# Patient Record
Sex: FEMALE | Race: WHITE
[De-identification: names, ages, dates, MRNs, and addresses within clinical notes are randomized per-mention and may not be internally consistent; named-entity substitution may affect disease eponyms.]

---

## 2017-09-16 NOTE — RADIOLOGY REPORT (SQ)
EXAM DESCRIPTION:  CERV SP 4 OR 5 VIEWS



COMPLETED DATE/TIME:  9/16/2017 11:05 am



REASON FOR STUDY:  NECK PAIN M54.2  CERVICALGIA



COMPARISON:  None.



NUMBER OF VIEWS:  Five views.



TECHNIQUE:  AP, lateral, obliques and odontoid radiographic images acquired of the cervical spine.



LIMITATIONS:  None.



FINDINGS:  MINERALIZATION: Normal.

ALIGNMENT: Anatomic.

VERTEBRAE: Vertebral bodies of normal height.

DISCS: No significant osteophytes or sclerosis. Disc height maintained.

FORAMINA: No osteophytes or foraminal narrowing.

LATERAL AND POSTERIOR ELEMENTS: Facets, lateral masses and spinous processes without significant find
ings.

HARDWARE: None in the spine.

SOFT TISSUES: No masses or calcifications. Lung apices clear.

OTHER: No other significant finding.



IMPRESSION:  NO SIGNIFICANT RADIOGRAPHIC FINDING IN THE CERVICAL SPINE.



TECHNICAL DOCUMENTATION:  JOB ID:  [id number]

 2011 [employer]- All Rights Reserved

## 2017-09-18 NOTE — RADIOLOGY REPORT (SQ)
EXAM DESCRIPTION:  LUMBAR SPINE COMPLETE



COMPLETED DATE/TIME:  9/18/2017 4:20 pm



REASON FOR STUDY:  LUMBAGO WITH SCIATICA, LEFT SIDE M54.42  LUMBAGO WITH SCIATICA, LEFT SIDE



COMPARISON:  None.



NUMBER OF VIEWS:  Five views including obliques.



TECHNIQUE:  AP, lateral, oblique, and sacral radiographic images acquired of the lumbar spine.



LIMITATIONS:  None.



FINDINGS:  MINERALIZATION: Normal.

SEGMENTATION: Normal.  No transitional anatomy.

ALIGNMENT: Normal.

VERTEBRAE: Maintained height.  No fracture or worrisome bone lesion.

DISCS: Preserved height.  No significant osteophytes or end plate irregularity.

POSTERIOR ELEMENTS: Pedicles and facets are intact.  No pars defect or posterior arch defects.

HARDWARE: None in the spine.

PARASPINAL SOFT TISSUES: Normal.

PELVIS: Intact as visualized. No fractures or worrisome bone lesions. SI joints intact.

OTHER: No other significant finding.



IMPRESSION:  NORMAL 5 VIEW LUMBAR SPINE.



TECHNICAL DOCUMENTATION:  JOB ID:  [id number]

 2011 [employer]- All Rights Reserved

## 2017-10-09 NOTE — WOMENS IMAGING REPORT
EXAM DESCRIPTION:  BILAT SCREENING MAMMO W/CAD



COMPLETED DATE/TIME:  10/9/2017 7:22 am



REASON FOR STUDY:  SCREENING MAMMO Z12.31  ENCNTR SCREEN MAMMOGRAM FOR MALIGNANT NEOPLASM OF ROSANA



COMPARISON:  2013, 2016



TECHNIQUE:  Standard craniocaudal and mediolateral oblique views of each breast recorded using digita
l acquisition.



LIMITATIONS:  None.



FINDINGS:  No masses, calcifications or architectural distortion. No areas of suspicion.

Read with the assistance of CAD.

.[facility] - R2 Cenova Version 1.3

.[facility] - R2 Cenova Version 1.3

.[facility] - R2 Cenova Version 2.4

[place] Cenova [place] 2.[place]

.[facility] - R2  Version 9.2



IMPRESSION:  NORMAL MAMMOGRAM.  BIRADS 1.



BREAST DENSITY:  a. The breasts are almost entirely fatty.



BIRAD:  1 NEGATIVE



RECOMMENDATION:  ROUTINE SCREENING

Please consider bilateral screening tomosynthesis in October 2018



COMMENT:  The patient has been notified of the results by letter per SA requirements. Additional no
tification policies are in place for contacting patient with suspicious or incomplete findings.

Quality ID #225: The American College of Radiology recommends an annual screening mammogram for women
 aged 40 years or over. This facility utilizes a reminder system to ensure that all patients receive 
reminder letters, and/or direct phone calls for appointments. This includes reminders for routine scr
eening mammograms, diagnostic mammograms, or other Breast Imaging Interventions when appropriate.  Th
is patient will be placed in the appropriate reminder system.

The American College of Radiology (ACR) has developed recommendations for screening MRI of the breast
s in certain patient populations, to be used in conjunction with mammography.  Breast MRI surveillanc
e may be appropriate for women with more than 20% lifetime risk of developing breast cancer  as deter
mined by genetic testing, significant family history of the disease, or history of mantle radiation f
or Hodgkins Disease.  ACR Practice Guidelines 2008.



TECHNICAL DOCUMENTATION:  FINDING NUMBER: (1)

ASSESSMENT: (1)

JOB ID:  [id number]

 2011 [employer]- All Rights Reserved

## 2018-08-17 NOTE — WOMENS IMAGING REPORT
EXAM DESCRIPTION:  TRANSVAGINAL ULTRASOUND



COMPLETED DATE/TIME:  8/17/2018 1:24 pm



REASON FOR STUDY:  PELVIC AND PERINEAL PAIN R10.2  PELVIC AND PERINEAL PAIN



COMPARISON:  9/2/2011 pelvic ultrasound



TECHNIQUE:  Dynamic and static grayscale images acquired of the pelvis via transvaginal approach and 
recorded on PACS. Additional selected color Doppler and spectral images recorded.



LIMITATIONS:  Left ovary not visualized due to adnexal bowel gas



FINDINGS:  UTERUS: Contour normal.  No mass.  Uterus is 8.5 x 4.9 x 4.2 cm in size.

ENDOMETRIAL STRIPE: Endometrial stripe 8 mm in thickness.  Trace amount of fluid in the right and lef
t cornu of the uterus in the endometrial canal.

CERVIX: No nabothian cysts.  Closed

RIGHT OVARY AND DOPPLER: Patient states she is post right oophorectomy in 1996

LEFT OVARY AND DOPPLER: Not visualized due to left adnexal bowel gas.

FREE FLUID: None noted.

OTHER: No other significant finding.



IMPRESSION:  Post right oophorectomy.  Left ovary not visualized.

Normal size uterus without fibroids.



TECHNICAL DOCUMENTATION:  JOB ID:  [id number]

 2011 [employer]- All Rights Reserved                          Rev-5/18



Reading location - IP/workstation name: [email or web address]

## 2018-08-24 NOTE — OPERATIVE REPORT
Operative Report


DATE OF SURGERY: 08/24/18


Operative Report: 





The risks, benefits and alternatives of the procedure including the risks of 

bleeding, perforation requiring surgery are explained to the patient in detail 

and informed consent is obtained.  Patient is brought back to the operating 

room and placed in the left, lateral decubital position.  Timeout was called.  

Propofol medication is administered.  Rectal examination is done which did not 

reveal any masses, tears or fissures.  An Olympus videoscope was inserted into 

the patient's rectum.  The scope was then carefully advanced all the way to the 

cecum.  The cecum was identified by the usual anatomical landmarks including 

the ileocecal valve as well as the appendiceal office.  Photodocumentation is 

obtained.  The scope was then sequentially pulled back via the rest segments of 

the colon including the ascending colon, hepatic flexure, transverse colon, 

splenic flexure, descending colon and finally into the rectosigmoid portions of 

the colon.  Retroflexion maneuvers performed.


The risks benefits and alternatives of the procedure explained to the patient 

in detail and informed consent is obtained.A GIF Olympus video scope was 

inserted into the patient's mouth and hypopharynx, the esophagus is identified 

intubated and insufflated, the scope was then advanced through the esophagus 

stomach and duodenum ,retroflexion maneuver is done, the esophagus stomach and 

first and second portions of the duodenum examined


PREOPERATIVE DIAGNOSIS: Change in bowel habits.  GERD, dysphagia


POSTOPERATIVE DIAGNOSIS: Right sidecolon Information status post biopsy.  Colon 

polyp was removed via snare polypectomy.  Internal hemorrhoids.  Esophagitis 

status post biopsy rule out eosinophilic esophagitis.  Gastritis status post 

biopsy rule out Helicobacter pylori


OPERATION: Colonoscopy with snare polypectomy.  Colonoscopy with biopsy.  EGD 

with biopsy


SURGEON: SALVADOR COLÓN


ANESTHESIA: LMAC


TISSUE REMOVED OR ALTERED: As noted above.


COMPLICATIONS: 





None.


ESTIMATED BLOOD LOSS: None.


INTRAOPERATIVE FINDINGS: As noted above.


PROCEDURE: 





Patient tolerated the procedure well.


No immediate postprocedure complications are noted.


Patient discharged in good condition.


Discharge date 8/24/2018.


Discharge diet: Regular.


Discharge activity: Regular.


2-3 week follow-up to discuss findings.


Patient is instructed call the office or proceed to the emergency room should 

there be any further problems or questions.


3-5 year surveillance colonoscopy.

## 2018-11-03 NOTE — WOMENS IMAGING REPORT
EXAM DESCRIPTION:  3D SCREENING MAMMO BILAT



COMPLETED DATE/TIME:  11/2/2018 4:07 pm



REASON FOR STUDY:  BILATERAL SCREENING MAMMO/Z12.31 Z12.31  ENCNTR SCREEN MAMMOGRAM FOR MALIGNANT MIRELLA
PLASM OF ROSANA



COMPARISON:  10/9/2017 and 10/7/2016.



TECHNIQUE:  Standard craniocaudal and mediolateral oblique views of each breast recorded using digita
l acquisition and breast tomosynthesis.



LIMITATIONS:  None.



FINDINGS:  No masses, calcifications or architectural distortion. No areas of suspicion.

Read with the assistance of CAD.

.[facility] - R2 Cenova Version 1.3

.[facility] - R2 Cenova Version 1.3

.[facility] - R2 Cenova Version 2.4

[place]2 Cenova Version 2.4

.[facility] - R2  Version 9.2



IMPRESSION:  NORMAL MAMMOGRAM.  BIRADS 1.



BREAST DENSITY:  a. The breasts are almost entirely fatty.



BIRAD:  1 NEGATIVE



RECOMMENDATION:  ROUTINE SCREENING



COMMENT:  The patient has been notified of the results by letter per MQSA requirements. Additional no
tification policies are in place for contacting patient with suspicious or incomplete findings.

Quality ID #225: The American College of Radiology recommends an annual screening mammogram for women
 aged 40 years or over. This facility utilizes a reminder system to ensure that all patients receive 
reminder letters, and/or direct phone calls for appointments. This includes reminders for routine scr
eening mammograms, diagnostic mammograms, or other Breast Imaging Interventions when appropriate.  Th
is patient will be placed in the appropriate reminder system.

The American College of Radiology (ACR) has developed recommendations for screening MRI of the breast
s in certain patient populations, to be used in conjunction with mammography.  Breast MRI surveillanc
e may be appropriate for women with more than 20% lifetime risk of developing breast cancer  as deter
mined by genetic testing, significant family history of the disease, or history of mantle radiation f
or Hodgkins Disease.  ACR Practice Guidelines 2008.

DBT Technology

DBT is a type of tomographic mammography. With conventional mammography, overlapping breast tissue ma
y make lesions difficult to detect, even with good compression. DBT uses an x-ray tube that rotates a
round the breast, taking images at different angles. These images are then combined to create thin sl
ices of the breast that the radiologist can view as a 3D reconstruction. The [facility] can perform
 full-field digital mammograms (2D imaging); or DBT (3D imaging); or both, in a combination mode that
 quickly performs both the mammogram and the tomosynthesis scan while the breast is still compressed.


PQRS 6045F: Fluoroscopic imaging is not utilized for breast tomosynthesis.



TECHNICAL DOCUMENTATION:  FINDING NUMBER: (1)

ASSESSMENT:  (1)

JOB ID:  [id number]

 2011 [employer]- All Rights Reserved



Reading location - IP/workstation name: [email or web address]

## 2019-08-18 ENCOUNTER — HOSPITAL ENCOUNTER (EMERGENCY)
Dept: HOSPITAL 62 - ER | Age: 39
Discharge: HOME | End: 2019-08-18
Payer: MEDICAID

## 2019-08-18 VITALS — DIASTOLIC BLOOD PRESSURE: 82 MMHG | SYSTOLIC BLOOD PRESSURE: 143 MMHG

## 2019-08-18 DIAGNOSIS — R10.13: Primary | ICD-10-CM

## 2019-08-18 DIAGNOSIS — R19.7: ICD-10-CM

## 2019-08-18 DIAGNOSIS — Z87.891: ICD-10-CM

## 2019-08-18 DIAGNOSIS — R11.2: ICD-10-CM

## 2019-08-18 DIAGNOSIS — I10: ICD-10-CM

## 2019-08-18 LAB
ADD MANUAL DIFF: NO
ALBUMIN SERPL-MCNC: 5.1 G/DL (ref 3.5–5)
ALP SERPL-CCNC: 63 U/L (ref 38–126)
ANION GAP SERPL CALC-SCNC: 12 MMOL/L (ref 5–19)
APPEARANCE UR: (no result)
APTT PPP: YELLOW S
AST SERPL-CCNC: 31 U/L (ref 14–36)
BASOPHILS # BLD AUTO: 0 10^3/UL (ref 0–0.2)
BASOPHILS NFR BLD AUTO: 0.2 % (ref 0–2)
BILIRUB DIRECT SERPL-MCNC: 0.3 MG/DL (ref 0–0.4)
BILIRUB SERPL-MCNC: 1.3 MG/DL (ref 0.2–1.3)
BILIRUB UR QL STRIP: NEGATIVE
BUN SERPL-MCNC: 13 MG/DL (ref 7–20)
CALCIUM: 10.3 MG/DL (ref 8.4–10.2)
CHLORIDE SERPL-SCNC: 100 MMOL/L (ref 98–107)
CO2 SERPL-SCNC: 29 MMOL/L (ref 22–30)
EOSINOPHIL # BLD AUTO: 0.1 10^3/UL (ref 0–0.6)
EOSINOPHIL NFR BLD AUTO: 0.7 % (ref 0–6)
ERYTHROCYTE [DISTWIDTH] IN BLOOD BY AUTOMATED COUNT: 13.8 % (ref 11.5–14)
GLUCOSE SERPL-MCNC: 99 MG/DL (ref 75–110)
GLUCOSE UR STRIP-MCNC: NEGATIVE MG/DL
HCT VFR BLD CALC: 48.9 % (ref 36–47)
HGB BLD-MCNC: 16 G/DL (ref 12–15.5)
KETONES UR STRIP-MCNC: 20 MG/DL
LYMPHOCYTES # BLD AUTO: 3.3 10^3/UL (ref 0.5–4.7)
LYMPHOCYTES NFR BLD AUTO: 23.9 % (ref 13–45)
MCH RBC QN AUTO: 27.2 PG (ref 27–33.4)
MCHC RBC AUTO-ENTMCNC: 32.7 G/DL (ref 32–36)
MCV RBC AUTO: 83 FL (ref 80–97)
MONOCYTES # BLD AUTO: 0.8 10^3/UL (ref 0.1–1.4)
MONOCYTES NFR BLD AUTO: 6.1 % (ref 3–13)
NEUTROPHILS # BLD AUTO: 9.7 10^3/UL (ref 1.7–8.2)
NEUTS SEG NFR BLD AUTO: 69.1 % (ref 42–78)
NITRITE UR QL STRIP: NEGATIVE
PH UR STRIP: 6 [PH] (ref 5–9)
PLATELET # BLD: 339 10^3/UL (ref 150–450)
POTASSIUM SERPL-SCNC: 4.4 MMOL/L (ref 3.6–5)
PROT SERPL-MCNC: 8.7 G/DL (ref 6.3–8.2)
PROT UR STRIP-MCNC: NEGATIVE MG/DL
RBC # BLD AUTO: 5.89 10^6/UL (ref 3.72–5.28)
SP GR UR STRIP: 1.02
TOTAL CELLS COUNTED % (AUTO): 100 %
UROBILINOGEN UR-MCNC: NEGATIVE MG/DL (ref ?–2)
WBC # BLD AUTO: 13.9 10^3/UL (ref 4–10.5)

## 2019-08-18 PROCEDURE — 96375 TX/PRO/DX INJ NEW DRUG ADDON: CPT

## 2019-08-18 PROCEDURE — 96374 THER/PROPH/DIAG INJ IV PUSH: CPT

## 2019-08-18 PROCEDURE — 93010 ELECTROCARDIOGRAM REPORT: CPT

## 2019-08-18 PROCEDURE — 76705 ECHO EXAM OF ABDOMEN: CPT

## 2019-08-18 PROCEDURE — 99284 EMERGENCY DEPT VISIT MOD MDM: CPT

## 2019-08-18 PROCEDURE — 84484 ASSAY OF TROPONIN QUANT: CPT

## 2019-08-18 PROCEDURE — 36415 COLL VENOUS BLD VENIPUNCTURE: CPT

## 2019-08-18 PROCEDURE — 93005 ELECTROCARDIOGRAM TRACING: CPT

## 2019-08-18 PROCEDURE — 85025 COMPLETE CBC W/AUTO DIFF WBC: CPT

## 2019-08-18 PROCEDURE — 80053 COMPREHEN METABOLIC PANEL: CPT

## 2019-08-18 PROCEDURE — 81001 URINALYSIS AUTO W/SCOPE: CPT

## 2019-08-18 PROCEDURE — 74022 RADEX COMPL AQT ABD SERIES: CPT

## 2019-08-18 PROCEDURE — 83690 ASSAY OF LIPASE: CPT

## 2019-08-18 NOTE — EKG REPORT
SEVERITY:- OTHERWISE NORMAL ECG -

SINUS RHYTHM

BORDERLINE LEFT AXIS DEVIATION

:

Confirmed by: Fabio Larry 18-Aug-2019 11:42:53

## 2019-08-18 NOTE — RADIOLOGY REPORT (SQ)
EXAM DESCRIPTION:  ACUTE ABDOMEN SERIES



COMPLETED DATE/TIME:  8/18/2019 11:02 am



REASON FOR STUDY:  epigastric pain



COMPARISON:  None.



NUMBER OF VIEWS:  Three views.



TECHNIQUE:  Frontal chest, supine abdomen and upright/decubitus abdomen radiographic images acquired.




LIMITATIONS:  None.



FINDINGS:  CHEST: Lungs clear of infiltrates.

FREE AIR: None. No abnormal gas collections.

BOWEL GAS PATTERN: Nonobstructive pattern. No dilated loops or air fluid levels.

CALCIFICATIONS: No suspicious calcifications.

HARDWARE: None in the abdomen.

SOFT TISSUES: No gross mass or suggestion of organomegaly.

BONES: No acute fracture.  No worrisome bone lesions.

OTHER: No other significant finding.



IMPRESSION:  NO RADIOGRAPHIC EVIDENCE FOR ACUTE ABDOMINAL DISEASE.



TECHNICAL DOCUMENTATION:  JOB ID:  [id number]

 2011 [employer]- All Rights Reserved



Reading location - IP/workstation name: [email or web address]

## 2019-08-18 NOTE — ER DOCUMENT REPORT
Entered by ADRIENNE MELVIN SCRIBE  08/18/19 0933 





Acting as scribe for:LINDSEY RIGGS MD





ED General





- General


Chief Complaint: Abdominal Pain


Stated Complaint: ABDOMINAL PAIN


Time Seen by Provider: 08/18/19 09:27


Primary Care Provider: 


LYNETTE VALENCIA PA-C [Primary Care Provider] - Follow up in 3-5 days


Notes: 





38-year-old female presenting to the emergency department complaining of 

abdominal pain.  Patient states that.  She has had this pain for 3days 

intermittently, it became constant when she woke up this morning.  Patient 

states that she is also experiencing nausea, vomiting, diarrhea, has had trouble

sleeping, and trouble eating food.  Patient denies experiencing any sour taste 

in her mouth, or the pain radiating anywhere.


TRAVEL OUTSIDE OF THE [place] IN LAST 30 DAYS: No





- Related Data


Allergies/Adverse Reactions: 


                                        





No Known Allergies Allergy (Verified 08/18/19 09:08)


   











Past Medical History





- General


Information source: Patient





- Social History


Smoking Status: Former Smoker


Cigarette use (# per day): No


Chew tobacco use (# tins/day): No


Patient has suicidal ideation: No


Patient has homicidal ideation: No





- Past Medical History


Cardiac Medical History: Reports: Hx Hypertension


Past Surgical History: Reports: Hx Gynecologic Surgery - OOPHRECTOMY, Hx Tubal 

Ligation





- Immunizations


Immunizations up to date: Yes


Hx Diphtheria, Pertussis, Tetanus Vaccination: Yes





Review of Systems





- Review of Systems


Constitutional: No symptoms reported


EENT: No symptoms reported


Cardiovascular: No symptoms reported


Respiratory: No symptoms reported


Gastrointestinal: No symptoms reported


Genitourinary: No symptoms reported


Female Genitourinary: No symptoms reported


Musculoskeletal: No symptoms reported


Skin: No symptoms reported


Hematologic/Lymphatic: No symptoms reported


Neurological/Psychological: No symptoms reported


-: Yes All other systems reviewed and negative





Physical Exam





- Vital signs


Vitals: 


                                        











Temp Pulse Resp BP Pulse Ox


 


 97.6 F   78   18   163/118 H  99 


 


 08/18/19 09:13  08/18/19 09:13  08/18/19 09:13  08/18/19 09:13  08/18/19 09:13














Course





- Re-evaluation


Re-evalutation: 





08/18/19 10:47


Pain is reproducible with right upper quadrant palpation to epigastric palpation

not consistent with ACS.  Gallbladder ultrasound shows no acute findings other 

than fatty infiltration of liver.  Her labs are within normal limits are 

nonsignificant.  Will provide GI cocktail and reassess at this time.  She states

that she feels like something stabbing her in the epigastric area.  Also perform

abdominal series x-ray at this time.





- Vital Signs


Vital signs: 


                                        











Temp Pulse Resp BP Pulse Ox


 


 98.2 F   60   16   143/82 H  95 


 


 08/18/19 12:33  08/18/19 12:33  08/18/19 12:33  08/18/19 12:33  08/18/19 12:33














- Laboratory


Result Diagrams: 


                                 08/18/19 09:20





                                 08/18/19 09:20


Laboratory results interpreted by me: 


                                        











  08/18/19 08/18/19 08/18/19





  09:20 09:20 10:39


 


WBC  13.9 H  


 


RBC  5.89 H  


 


Hgb  16.0 H  


 


Hct  48.9 H  


 


Absolute Neutrophils  9.7 H  


 


Calcium   10.3 H 


 


Total Protein   8.7 H 


 


Albumin   5.1 H 


 


Urine Ketones    20 H


 


Urine Blood    MODERATE H














- EKG Interpretation by Me


EKG shows normal: Sinus rhythm


Rate: Normal


Rhythm: NSR - Concerning ST depressions or elevations with normal intervals





Discharge





- Discharge


Clinical Impression: 


 Epigastric pain





Condition: Good


Disposition: HOME, SELF-CARE


Instructions:  Abdominal Pain ([facility]), Antinausea Medication ([facility]), Antispasmodics

([facility]), Low-Fat Diet ([facility])


Prescriptions: 


Bismuth Subsalicylate [Maalox] 1 dose PO DAILY PRN #1 bottle


 PRN Reason: 


Ondansetron [Zofran Odt 4 mg Tablet] 1 tab PO Q4H PRN #15 tab.rapdis


 PRN Reason: For Nausea/Vomiting


Oxycodone HCl/Acetaminophen [Percocet 5-325 mg Tablet] 1 tab PO ASDIR PRN #15 

tablet


 PRN Reason: 


Ranitidine HCl [Zantac] 150 mg PO BID #30 tablet


Forms:  Return to Work


Referrals: 


LYNETTE VALENCIA PA-C [Primary Care Provider] - Follow up in 3-5 days





I personally performed the services described in the documentation, reviewed and

edited the documentation which was dictated to the scribe in my presence, and it

accurately records my words and actions.

## 2019-08-18 NOTE — RADIOLOGY REPORT (SQ)
EXAM DESCRIPTION:  U/S ABDOMEN LIMITED W/O DOP



COMPLETED DATE/TIME:  8/18/2019 10:26 am



REASON FOR STUDY:  RUQ pain



COMPARISON:  None.



TECHNIQUE:  Dynamic and static grayscale images acquired of the abdomen and recorded on PACS. Additio
nal selected color Doppler and spectral images recorded.



LIMITATIONS:  None.



FINDINGS:  PANCREAS: Limited visualization. no masses seen

LIVER: Normal size Moderate to marked fatty infiltration. No focal masses.

LIVER VASCULATURE: Normal directional flow of the main portal vein and hepatic veins.

GALLBLADDER: No stones. Normal wall thickness. No pericholecystic fluid.

ULTRASOUND-DETECTED ODELL'S SIGN: Negative.

INTRAHEPATIC DUCTS AND COMMON DUCT: CBD and intrahepatic ducts normal caliber. No filling defects.

INFERIOR VENA CAVA: Normal flow.

AORTA: No aneurysm.

RIGHT KIDNEY:  Normal size.   Normal echogenicity.   No solid or suspicious masses.   No hydronephros
is.   No calcifications.

PERITONEAL AND RIGHT PLEURAL SPACE: No ascites or effusions.

OTHER: No other significant findings.



IMPRESSION:  FATTY LIVER. OTHERWISE NORMAL RUQ US AS VISUALIZED



TECHNICAL DOCUMENTATION:  JOB ID:  [id number]

 [employer]- All Rights Reserved



Reading location - IP/workstation name: [email or web address]

## 2019-08-26 ENCOUNTER — HOSPITAL ENCOUNTER (OUTPATIENT)
Dept: HOSPITAL 62 - RAD | Age: 39
End: 2019-08-26
Attending: PHYSICIAN ASSISTANT
Payer: MEDICAID

## 2019-08-26 DIAGNOSIS — R10.84: Primary | ICD-10-CM

## 2019-08-26 PROCEDURE — 74177 CT ABD & PELVIS W/CONTRAST: CPT

## 2019-08-26 NOTE — RADIOLOGY REPORT (SQ)
EXAM DESCRIPTION:  CT ABD/PELVIS WITH IV   ORAL



COMPLETED DATE/TIME:  8/26/2019 4:10 pm



REASON FOR STUDY:  R10.84 GENERALIZED ABDOMINAL PAIN R10.84  GENERALIZED ABDOMINAL PAIN



COMPARISON:  CT abdomen pelvis 9/28/2011

Pelvic ultrasound 8/17/2018

Abdominal ultrasound 8/18/2019



TECHNIQUE:  CT scan of the abdomen and pelvis performed using helical scanning technique with dynamic
 intravenous contrast injection.  Patient drank oral contrast. Images reviewed with lung, soft tissue
, and bone windows. Reconstructed coronal and sagittal MPR images reviewed. Delayed images for evalua
tion of the urinary system also acquired. All images stored on PACS.

All CT scanners at this facility use dose modulation, iterative reconstruction, and/or weight based d
osing when appropriate to reduce radiation dose to as low as reasonably achievable (ALARA).

[facility]: Dose Right  [facility]: CareDose    [facility]: Dose Right    [facility]: Teradose 4D    [facility]: Smart Technologies



CONTRAST TYPE AND DOSE:  contrast/concentration: Isovue 350.00 mg/ml; Total Contrast Delivered: 100.0
 ml; Total Saline Delivered: 72.0 ml



RENAL FUNCTION:  Creatinine 0.65



RADIATION DOSE:  CT Rad equipment meets quality standard of care and radiation dose reduction techniq
ues were employed. CTDIvol: 19.0 - 20.3 mGy. DLP: 2083 mGy-cm..



LIMITATIONS:  None.



FINDINGS:  In the left adnexa, a complex cystic mass is present 6 x 5 cm in size with a mural nodule 
and septations.  This is worrisome for a primary ovarian mass, malignancy could not be excluded.  Rep
ort discussed with Radha Moreno.

Uterus is 9 x 4.5 x 6 cm in size without masses.  Right ovary not visualized.  No free pelvic fluid. 
 No gross adenopathy.  Bladder, rectum unremarkable.

LOWER CHEST: No significant findings. No nodules or infiltrates.

LIVER: Normal size. No masses.  No dilated ducts.

SPLEEN: Normal size. No focal lesions.

PANCREAS: No masses. No significant calcifications. No adjacent inflammation or peripancreatic fluid 
collections. Pancreatic duct not dilated.

GALLBLADDER: No identified stones by CT criteria. No inflammatory changes to suggest cholecystitis.

ADRENAL GLANDS: No significant masses or asymmetry.

RIGHT KIDNEY AND URETER: No solid masses.   No significant calcifications.   No hydronephrosis or hyd
roureter.

LEFT KIDNEY AND URETER: No solid masses.   No significant calcifications.   No hydronephrosis or hydr
oureter.

AORTA AND VESSELS: No aneurysm. No dissection. Renal arteries, SMA, celiac without stenosis.

RETROPERITONEUM: No retroperitoneal adenopathy, hemorrhage or masses.

BOWEL AND PERITONEAL CAVITY: Patient drank oral contrast.  No bowel obstruction.  No free intraperito
sánchez air or fluid.

APPENDIX: Normal.

PELVIS: As above

ABDOMINAL WALL: No masses. No hernias.

BONES: No significant or acute findings.

OTHER: No other significant finding.



IMPRESSION:  Complex cystic left adnexal mass, CT appearance is nonspecific.  This could represent a 
hemorrhagic cyst.  Cystic neoplasm could not be excluded.



COMMENT:  Report called to Radha Moreno at the time of dictation



TECHNICAL DOCUMENTATION:  JOB ID:  [id number]

Quality ID # 436: Final reports with documentation of one or more dose reduction techniques (e.g., Au
tomated exposure control, adjustment of the mA and/or kV according to patient size, use of iterative 
reconstruction technique)

 2011 [employer]- All Rights Reserved



Reading location - IP/workstation name: [email or web address]

## 2019-10-02 ENCOUNTER — HOSPITAL ENCOUNTER (OUTPATIENT)
Dept: HOSPITAL 62 - OD | Age: 39
End: 2019-10-02
Attending: PHYSICIAN ASSISTANT
Payer: MEDICAID

## 2019-10-02 DIAGNOSIS — R10.84: Primary | ICD-10-CM

## 2019-10-02 PROCEDURE — 74018 RADEX ABDOMEN 1 VIEW: CPT

## 2019-10-08 ENCOUNTER — HOSPITAL ENCOUNTER (OUTPATIENT)
Dept: HOSPITAL 62 - RAD | Age: 39
End: 2019-10-08
Attending: PHYSICIAN ASSISTANT
Payer: MEDICAID

## 2019-10-08 DIAGNOSIS — R10.84: ICD-10-CM

## 2019-10-08 DIAGNOSIS — K44.9: ICD-10-CM

## 2019-10-08 DIAGNOSIS — K21.9: Primary | ICD-10-CM

## 2019-10-08 PROCEDURE — 74220 X-RAY XM ESOPHAGUS 1CNTRST: CPT

## 2019-10-08 NOTE — RADIOLOGY REPORT (SQ)
EXAM DESCRIPTION:  BARIUM SWALLOW ESOPHAGUS



COMPLETED DATE/TIME:  10/8/2019 9:42 am



REASON FOR STUDY:  R10.84 GENERALIZED ABDOMINAL PAIN R10.84  GENERALIZED ABDOMINAL PAIN



COMPARISON:  None.



TECHNIQUE:  Under fluoroscopic guidance, patient ingested effervescent granules followed by thick and
 thin barium. Fluoroscopic spot images and routine radiographic images acquired and stored on PACS.

12 MM BARIUM TABLET GIVEN: 12 mm barium tablet passed easily through the esophagus and into the stoma
ch without delay.



LIMITATIONS:  None.



FLUOROSCOPY TIME:  FLUORO TIME: 2.04 minutes

6 images saved to PACS.



FINDINGS:  NEUROMUSCULAR COORDINATION OF SWALLOW: Normal. No aspiration.

ESOPHAGEAL MOTILITY: Normal peristalsis. No esophageal spasm.

ESOPHAGEAL MUCOSA: Normal mucosa without masses or ulceration.

GASTRO-ESOPHAGEAL JUNCTION: A small sliding-type hiatal hernia is present.  Gastroesophageal reflux t
o the level of the clavicles is demonstrated.

NON-GI TRACT STRUCTURES: No significant finding.

OTHER: No other significant finding.



IMPRESSION:  SMALL SLIDING HIATAL HERNIA WITH GASTROESOPHAGEAL REFLUX.



RECOMMENDATION:  None



COMMENT:  None

Quality :  Final reports for procedures using fluoroscopy that document radiation exposure quique
[place], or exposure time and number of fluorographic images (if radiation exposure indices are not avail
able)



TECHNICAL DOCUMENTATION:  JOB ID:  [id number]

 2011 [employer]- All Rights Reserved



Reading location - IP/workstation name: [email or web address] [id number]

## 2019-11-04 ENCOUNTER — HOSPITAL ENCOUNTER (OUTPATIENT)
Dept: HOSPITAL 62 - WI | Age: 39
End: 2019-11-04
Attending: PHYSICIAN ASSISTANT
Payer: MEDICAID

## 2019-11-04 DIAGNOSIS — Z12.31: Primary | ICD-10-CM

## 2019-11-04 PROCEDURE — 77063 BREAST TOMOSYNTHESIS BI: CPT

## 2019-11-04 PROCEDURE — 77067 SCR MAMMO BI INCL CAD: CPT

## 2019-11-04 NOTE — WOMENS IMAGING REPORT
EXAM DESCRIPTION:  3D SCREENING MAMMO BILAT



COMPLETED DATE/TIME:  11/4/2019 9:51 am



REASON FOR STUDY:  Z12.31 ENCOUNTER FOR SCREENING MAMMOGRAM FOR MALIGNANT NEOPLASM OF BREAST Z12.31  
ENCNTR SCREEN MAMMOGRAM FOR MALIGNANT NEOPLASM OF ROSANA



COMPARISON:  [id number]



EXAM PARAMETERS:  Views: Standard craniocaudal and mediolateral oblique views of each breast recorded
 using digital acquisition and breast tomosynthesis.

Read with the assistance of CAD.

.Replaced by [facility] - R2  Version 9.2



LIMITATIONS:  None.



FINDINGS:  No suspicious masses, suspicious calcifications or architectural distortion. No areas of c
oncern.



IMPRESSION:   NEGATIVE MAMMOGRAM. BIRADS 1.



BREAST DENSITY:  b. There are scattered areas of fibroglandular density.



BIRAD:  ASSESSMENT:  1 NEGATIVE



RECOMMENDATION:  ROUTINE SCREENING



COMMENT:  The patient has been notified of the results by letter per MQSA requirements. Additional no
tification policies are in place for contacting patient with suspicious or incomplete findings.

Quality ID #225: The American College of Radiology recommends an annual screening mammogram for women
 aged 40 years or over. This facility utilizes a reminder system to ensure that all patients receive 
reminder letters, and/or direct phone calls for appointments. This includes reminders for routine scr
eening mammograms, diagnostic mammograms, or other Breast Imaging Interventions when appropriate.  Th
is patient will be placed in the appropriate reminder system.



TECHNICAL DOCUMENTATION:  FINDING NUMBER: (1)

ASSESSMENT:  (1)

JOB ID:  [id number]

 2011 [employer]- All Rights Reserved



Reading location - IP/workstation name: [email or web address]

## 2020-02-11 ENCOUNTER — HOSPITAL ENCOUNTER (OUTPATIENT)
Dept: HOSPITAL 62 - RAD | Age: 40
End: 2020-02-11
Attending: PHYSICIAN ASSISTANT
Payer: MEDICAID

## 2020-02-11 DIAGNOSIS — R07.9: Primary | ICD-10-CM

## 2020-02-11 PROCEDURE — 71046 X-RAY EXAM CHEST 2 VIEWS: CPT

## 2020-02-11 NOTE — RADIOLOGY REPORT (SQ)
EXAM DESCRIPTION:  CHEST 2 VIEWS



COMPLETED DATE/TIME:  2/11/2020 8:49 am



REASON FOR STUDY:  CHEST PAIN (R07.9)



COMPARISON:  5/11/2009



EXAM PARAMETERS:  NUMBER OF VIEWS: two views

TECHNIQUE: Digital Frontal and Lateral radiographic views of the chest acquired.

RADIATION DOSE: NA

LIMITATIONS: none



FINDINGS:  LUNGS AND PLEURA: No opacities, masses or pneumothorax. No pleural effusion.

MEDIASTINUM AND HILAR STRUCTURES: No masses or contour abnormalities.

HEART AND VASCULAR STRUCTURES: Heart normal size.  No evidence for failure.

BONES: No acute findings.

HARDWARE: None in the chest.

OTHER: No other significant finding.



IMPRESSION:  1.  No significant interval changes since the prior study dated 5/11/2009.  No acute fin
dings.



TECHNICAL DOCUMENTATION:  JOB ID:  [id number]

 2011 [employer]- All Rights Reserved



Reading location - IP/workstation name: [email or web address]

## 2020-07-02 ENCOUNTER — HOSPITAL ENCOUNTER (OUTPATIENT)
Dept: HOSPITAL 62 - SP | Age: 40
End: 2020-07-02
Attending: PHYSICIAN ASSISTANT
Payer: MEDICAID

## 2020-07-02 DIAGNOSIS — R60.0: Primary | ICD-10-CM

## 2020-07-02 PROCEDURE — 93970 EXTREMITY STUDY: CPT

## 2020-07-02 NOTE — RADIOLOGY REPORT (SQ)
EXAM DESCRIPTION:  VENOUS BILATERAL LOWER



IMAGES COMPLETED DATE/TIME:  7/2/2020 12:00 pm



REASON FOR STUDY:  EDEMA R60.0  LOCALIZED EDEMA



COMPARISON:  None.



TECHNIQUE:  Dynamic and static gray scale and color images acquired of both lower extremity venous sy
stems. Selected spectral images acquired with additional compression and augmentation maneuvers. Imag
es stored on PACS.



LIMITATIONS:  None.



FINDINGS:  RIGHT LEG

COMMON FEMORAL AND FEMORAL: Normal phasicity, compression and augmentation. No visualized echogenic m
aterial on gray scale. No defects on color images.

POPLITEAL: Normal compression and augmentation. No visualized echogenic material on gray scale. No de
fects on color images.

CALF VESSELS: Normal compression and augmentation. No visualized echogenic material on gray scale. No
 defects on color image.

GSV AND SSV: Normal compression. No visualized echogenic material on gray scale. No defects on color 
images.

ANY DEEP VENOUS INSUFFICIENCY: Not evaluated.

ANY EVIDENCE OF POPLITEAL CYST: No.

OTHER: No other significant finding.

LEFT LEG

COMMON FEMORAL AND FEMORAL: Normal phasicity, compression and augmentation. No visualized echogenic m
aterial on gray scale. No defects on color images.

POPLITEAL: Normal compression and augmentation. No visualized echogenic material on gray scale. No de
fects on color images.

CALF VESSELS: Normal compression and augmentation. No visualized echogenic material on gray scale. No
 defects on color images.

GSV AND SSV: Normal compression. No visualized echogenic material on gray scale. No defects on color 
images.

ANY DEEP VENOUS INSUFFICIENCY: Not evaluated.

ANY EVIDENCE POPLITEAL CYST: No.

OTHER: No other significant finding.



IMPRESSION:  NO EVIDENCE DVT OR SVT IN EITHER LEG.



TECHNICAL DOCUMENTATION:  JOB ID:  [id number]

 2011 "[employer]- All Rights Reserved



Reading location - IP/workstation name: [email or web address]

## 2020-08-20 ENCOUNTER — HOSPITAL ENCOUNTER (OUTPATIENT)
Dept: HOSPITAL 62 - WI | Age: 40
End: 2020-08-20
Attending: PHYSICIAN ASSISTANT
Payer: MEDICAID

## 2020-08-20 DIAGNOSIS — N64.4: Primary | ICD-10-CM

## 2020-08-20 PROCEDURE — 77066 DX MAMMO INCL CAD BI: CPT

## 2020-08-20 PROCEDURE — G0279 TOMOSYNTHESIS, MAMMO: HCPCS

## 2020-08-20 PROCEDURE — 76641 ULTRASOUND BREAST COMPLETE: CPT

## 2020-08-20 PROCEDURE — 77062 BREAST TOMOSYNTHESIS BI: CPT

## 2020-08-20 NOTE — WOMENS IMAGING REPORT
EXAM DESCRIPTION:  3D DX MAMMO BILAT; U/S BREAST UNILATERAL, COMPL



IMAGES COMPLETED DATE/TIME:  8/20/2020 10:10 am; 8/20/2020 10:43 am



REASON FOR STUDY:  N64.4 MASTODYNIA; LT BREAST PAIN N64.4 N64.4  MASTODYNIA



COMPARISON:  11/4/2019, 11/2/2018, 10/9/2017



EXAM PARAMETERS:  Standard craniocaudal and mediolateral oblique views of each breast recorded using 
digital acquisition and breast tomosynthesis.

Subsequently, targeted sonographic evaluation was performed of the patient-identified area of concern
 involving the left breast.

Read with the assistance of CAD:

.[facility] - [employer]kip[employer]  Version 9.2



LIMITATIONS:  None.



FINDINGS:  RIGHT BREAST

MASSES: No suspicious masses.

CALCIFICATIONS: No new or suspicious calcifications.

ARCHITECTURAL DISTORTION: None.

ASYMMETRY: None noted.

OTHER: No other significant findings.

LEFT BREAST

MASSES: No suspicious masses.

CALCIFICATIONS: No new or suspicious calcifications.

ARCHITECTURAL DISTORTION: None.

ASYMMETRY: None noted.

OTHER: Sonographic evaluation of the left breast demonstrates normal underlying parenchymal tissues.



IMPRESSION:  No evidence of malignancy on today's examination.



BREAST DENSITY:  a. The breasts are almost entirely fatty.



BIRAD:  ASSESSMENT:  1 Negative.



RECOMMENDATION:  RECOMMENDED FOLLOW UP: Birads 1 or 2: The patient should resume routine screening .

SPECIFIC INTERVENTION/IMAGING/CONSULTATION RECOMMENDED:No additional intervention/ imaging/consultati
on needed at this time.

COMMUNICATION:The imaging findings were not discussed with the patient. Her referring provider has be
en notified of the findings.



COMMENT:  The patient has been notified of the results by letter per SA requirements. Additional no
tification policies are in place for contacting patient with suspicious or incomplete findings.

Quality ID #225: The American College of Radiology recommends an annual screening mammogram for women
 aged 40 years or over. This facility utilizes a reminder system to ensure that all patients receive 
reminder letters, and/or direct phone calls for appointments. This includes reminders for routine scr
eening mammograms, diagnostic mammograms, or other Breast Imaging Interventions when appropriate.  Th
is patient will be placed in the appropriate reminder system.



TECHNICAL DOCUMENTATION:  FINDING NUMBER: (1)

ASSESSMENT: (1)

JOB ID:  [id number]

 2011 [employer]- All Rights Reserved



Reading location - IP/workstation name: [email or web address]

## 2020-08-20 NOTE — WOMENS IMAGING REPORT
EXAM DESCRIPTION:  3D DX MAMMO BILAT; U/S BREAST UNILATERAL, COMPL



IMAGES COMPLETED DATE/TIME:  8/20/2020 10:10 am; 8/20/2020 10:43 am



REASON FOR STUDY:  N64.4 MASTODYNIA; LT BREAST PAIN N64.4 N64.4  MASTODYNIA



COMPARISON:  11/4/2019, 11/2/2018, 10/9/2017



EXAM PARAMETERS:  Standard craniocaudal and mediolateral oblique views of each breast recorded using 
digital acquisition and breast tomosynthesis.

Subsequently, targeted sonographic evaluation was performed of the patient-identified area of concern
 involving the left breast.

Read with the assistance of CAD:

.[facility] - [employer]  Version 9.2



LIMITATIONS:  None.



FINDINGS:  RIGHT BREAST

MASSES: No suspicious masses.

CALCIFICATIONS: No new or suspicious calcifications.

ARCHITECTURAL DISTORTION: None.

ASYMMETRY: None noted.

OTHER: No other significant findings.

LEFT BREAST

MASSES: No suspicious masses.

CALCIFICATIONS: No new or suspicious calcifications.

ARCHITECTURAL DISTORTION: None.

ASYMMETRY: None noted.

OTHER: Sonographic evaluation of the left breast demonstrates normal underlying parenchymal tissues.



IMPRESSION:  No evidence of malignancy on today's examination.



BREAST DENSITY:  a. The breasts are almost entirely fatty.



BIRAD:  ASSESSMENT:  1 Negative.



RECOMMENDATION:  RECOMMENDED FOLLOW UP: Birads 1 or 2: The patient should resume routine screening .

SPECIFIC INTERVENTION/IMAGING/CONSULTATION RECOMMENDED:No additional intervention/ imaging/consultati
on needed at this time.

COMMUNICATION:The imaging findings were not discussed with the patient. Her referring provider has be
en notified of the findings.



COMMENT:  The patient has been notified of the results by letter per SA requirements. Additional no
tification policies are in place for contacting patient with suspicious or incomplete findings.

Quality ID #225: The American College of Radiology recommends an annual screening mammogram for women
 aged 40 years or over. This facility utilizes a reminder system to ensure that all patients receive 
reminder letters, and/or direct phone calls for appointments. This includes reminders for routine scr
eening mammograms, diagnostic mammograms, or other Breast Imaging Interventions when appropriate.  Th
is patient will be placed in the appropriate reminder system.



TECHNICAL DOCUMENTATION:  FINDING NUMBER: (1)

ASSESSMENT: (1)

JOB ID:  [id number]

 2011 [employer]- All Rights Reserved



Reading location - IP/workstation name: [email or web address]

## 2020-09-03 ENCOUNTER — HOSPITAL ENCOUNTER (OUTPATIENT)
Dept: HOSPITAL 62 - WI | Age: 40
End: 2020-09-03
Attending: PHYSICIAN ASSISTANT
Payer: MEDICAID

## 2020-09-03 DIAGNOSIS — R74.8: Primary | ICD-10-CM

## 2020-09-03 PROCEDURE — 76700 US EXAM ABDOM COMPLETE: CPT

## 2020-09-03 NOTE — WOMENS IMAGING REPORT
EXAM DESCRIPTION:  U/S ABDOMEN TOTAL



IMAGES COMPLETED DATE/TIME:  9/3/2020 11:27 am



REASON FOR STUDY:  R74.8 ABNORMAL LEVELS OF OTHER SERUM ENZYMES R74.8  ABNORMAL LEVELS OF OTHER SERUM
 ENZYMES



COMPARISON:  8/18/2019



TECHNIQUE:  Dynamic and static grayscale images acquired of the abdomen and recorded on PACS. Additio
nal selected color Doppler and spectral images recorded.



LIMITATIONS:  Body habitus.  Overlying bowel gas.



FINDINGS:  PANCREAS: No masses. Visualized pancreatic duct normal caliber.

LIVER: 21 cm.  Echotexture is coarse with increased echogenicity consistent with fatty infiltration.

LIVER VASCULATURE: Normal directional flow of the main portal vein and hepatic veins.

GALLBLADDER: No stones. Normal wall thickness. No pericholecystic fluid.

ULTRASOUND-DETECTED ODELL'S SIGN: Negative.

INTRAHEPATIC DUCTS AND COMMON DUCT: CBD and intrahepatic ducts normal caliber. No filling defects.

INFERIOR VENA CAVA: Normal flow.

AORTA: No aneurysm.

RIGHT KIDNEY:  Normal size.   Normal echogenicity.   No solid or suspicious masses.   No hydronephros
is.   No calcifications.

LEFT KIDNEY:  Normal size.   Normal echogenicity.   No solid or suspicious masses.   No hydronephrosi
s.   No calcifications.

SPLEEN:Normal size.  No solid masses.

PERITONEAL AND PLEURAL SPACES: No ascites or effusions.

OTHER: No other significant finding.



IMPRESSION:  FATTY LIVER. NO OTHER SIGNIFICANT FINDING.



TECHNICAL DOCUMENTATION:  JOB ID:  [id number]

 2011 [employer]- All Rights Reserved



Reading location - IP/workstation name: [email or web address]

## 2021-01-25 ENCOUNTER — HOSPITAL ENCOUNTER (EMERGENCY)
Dept: HOSPITAL 62 - ER | Age: 41
Discharge: HOME | End: 2021-01-25
Payer: COMMERCIAL

## 2021-01-25 VITALS — SYSTOLIC BLOOD PRESSURE: 144 MMHG | DIASTOLIC BLOOD PRESSURE: 76 MMHG
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
## 2021-01-25 DIAGNOSIS — I10: ICD-10-CM

## 2021-01-25 DIAGNOSIS — M50.30: ICD-10-CM

## 2021-01-25 DIAGNOSIS — S40.012A: Primary | ICD-10-CM

## 2021-01-25 DIAGNOSIS — M25.572: ICD-10-CM

## 2021-01-25 DIAGNOSIS — M25.571: ICD-10-CM

## 2021-01-25 PROCEDURE — 72040 X-RAY EXAM NECK SPINE 2-3 VW: CPT

## 2021-01-25 PROCEDURE — 99284 EMERGENCY DEPT VISIT MOD MDM: CPT

## 2021-01-25 NOTE — RADIOLOGY REPORT (SQ)
EXAM DESCRIPTION:  SHOULDER LEFT 2 OR MORE VIEWS



IMAGES COMPLETED DATE/TIME:  1/25/2021 6:49 pm



REASON FOR STUDY:  injury



COMPARISON:  None.



NUMBER OF VIEWS:  Three views.



TECHNIQUE:  Internal rotation, external rotation, and Y view images acquired of the left shoulder.



LIMITATIONS:  None.



FINDINGS:  MINERALIZATION: Normal.

BONES: No acute fracture.  No worrisome bone lesions.

JOINTS: No dislocation.

VISUALIZED LUNGS AND RIBS: No pneumothorax.  No rib fracture.

SOFT TISSUES: No radiopaque foreign body.

OTHER: No other significant finding.



IMPRESSION:  NEGATIVE STUDY OF THE LEFT SHOULDER. NO RADIOGRAPHIC EVIDENCE OF ACUTE INJURY.



TECHNICAL DOCUMENTATION:  JOB ID:  [id number]

 2011 [employer]- All Rights Reserved



Reading location - IP/workstation name: [email or web address]

## 2021-01-25 NOTE — RADIOLOGY REPORT (SQ)
EXAM DESCRIPTION:  ANKLE BILATERAL 3 VIEWS MIN



IMAGES COMPLETED DATE/TIME:  1/25/2021 6:49 pm



REASON FOR STUDY:  injury



COMPARISON:  None.



NUMBER OF VIEWS:  Three views.



TECHNIQUE:  AP, lateral, and oblique radiographic images acquired of the right and left ankle.



LIMITATIONS:  None.



FINDINGS:  MINERALIZATION: Normal.

BONES: No acute fracture or dislocation. No worrisome bone lesions.

JOINTS: No effusions.

SOFT TISSUES: No soft tissue swelling. No foreign body.

OTHER: No other significant finding.



IMPRESSION:  NO RADIOGRAPHIC EVIDENCE OF ACUTE INJURY OF THE RIGHT AND LEFT ANKLES.



TECHNICAL DOCUMENTATION:  JOB ID:  [id number]

 2011 [employer]- All Rights Reserved



Reading location - IP/workstation name: [email or web address]

## 2021-01-25 NOTE — RADIOLOGY REPORT (SQ)
EXAM DESCRIPTION:  CERV SP 3 VIEW OR LESS



IMAGES COMPLETED DATE/TIME:  1/25/2021 6:49 pm



REASON FOR STUDY:  injury



COMPARISON:  None.



NUMBER OF VIEWS:  Four views



TECHNIQUE:  AP, lateral, swimmer's lateral, and odontoid radiographic images acquired of the cervical
 spine.



LIMITATIONS:  None.



FINDINGS:  MINERALIZATION: Normal.

ALIGNMENT: Anatomic.

VERTEBRAE: Vertebral bodies of normal height.

DISCS: There is mild disc narrowing at C5-6 with small marginal osteophytes.

HARDWARE: None in the spine.

SOFT TISSUES: No masses or calcifications. Lung apices clear.

OTHER: No other significant finding.



IMPRESSION:  Mild degenerative disc disease and spondylosis.  No acute finding.



TECHNICAL DOCUMENTATION:  JOB ID:  [id number]

 2011 [employer]- All Rights Reserved



Reading location - IP/workstation name: [email or web address]
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
## 2023-08-05 NOTE — RADIOLOGY REPORT (SQ)
EXAM DESCRIPTION:  KUB



COMPLETED DATE/TIME:  10/2/2019 12:51 pm



REASON FOR STUDY:  GENERALIZED ABDOMINAL PAIN R10.84  GENERALIZED ABDOMINAL PAIN



COMPARISON:  8/18/2019



NUMBER OF VIEWS:  One view.



TECHNIQUE:   Supine radiographic image of the abdomen acquired.



LIMITATIONS:  None.



FINDINGS:  BOWEL GAS PATTERN: Normal bowel gas pattern. No dilated loops.

CALCIFICATIONS: No suspicious calcifications.

SOFT TISSUES: No gross mass or suggestion of organomegaly.

HARDWARE: None in the abdomen.

BONES: No acute fracture. No worrisome bone lesions.

OTHER: No other significant finding.



IMPRESSION:  NO RADIOGRAPHIC EVIDENCE FOR ACUTE ABDOMINAL DISEASE.



TECHNICAL DOCUMENTATION:  JOB ID:  [id number]

 2011 [employer]- All Rights Reserved



Reading location - IP/workstation name: [email or web address]